# Patient Record
Sex: FEMALE | RURAL
[De-identification: names, ages, dates, MRNs, and addresses within clinical notes are randomized per-mention and may not be internally consistent; named-entity substitution may affect disease eponyms.]

---

## 2020-10-27 ENCOUNTER — HISTORICAL (OUTPATIENT)
Dept: ADMINISTRATIVE | Facility: HOSPITAL | Age: 20
End: 2020-10-27

## 2020-10-28 LAB — HCG SERPL-ACNC: NORMAL MIU/ML

## 2025-03-05 DIAGNOSIS — K21.9 GERD (GASTROESOPHAGEAL REFLUX DISEASE): Primary | ICD-10-CM

## 2025-04-23 ENCOUNTER — OFFICE VISIT (OUTPATIENT)
Dept: GASTROENTEROLOGY | Facility: CLINIC | Age: 25
End: 2025-04-23
Payer: COMMERCIAL

## 2025-04-23 VITALS
DIASTOLIC BLOOD PRESSURE: 56 MMHG | WEIGHT: 225.38 LBS | HEIGHT: 65 IN | SYSTOLIC BLOOD PRESSURE: 115 MMHG | HEART RATE: 61 BPM | BODY MASS INDEX: 37.55 KG/M2 | OXYGEN SATURATION: 100 %

## 2025-04-23 DIAGNOSIS — K21.9 GASTROESOPHAGEAL REFLUX DISEASE, UNSPECIFIED WHETHER ESOPHAGITIS PRESENT: ICD-10-CM

## 2025-04-23 DIAGNOSIS — R19.4 CHANGE IN BOWEL HABITS: ICD-10-CM

## 2025-04-23 DIAGNOSIS — R10.13 EPIGASTRIC ABDOMINAL PAIN: Primary | ICD-10-CM

## 2025-04-23 PROCEDURE — 3074F SYST BP LT 130 MM HG: CPT | Mod: CPTII,,,

## 2025-04-23 PROCEDURE — 99999 PR PBB SHADOW E&M-NEW PATIENT-LVL IV: CPT | Mod: PBBFAC,,,

## 2025-04-23 PROCEDURE — 3078F DIAST BP <80 MM HG: CPT | Mod: CPTII,,,

## 2025-04-23 PROCEDURE — 99204 OFFICE O/P NEW MOD 45 MIN: CPT | Mod: PBBFAC

## 2025-04-23 PROCEDURE — 99204 OFFICE O/P NEW MOD 45 MIN: CPT | Mod: S$PBB,,,

## 2025-04-23 PROCEDURE — 1159F MED LIST DOCD IN RCRD: CPT | Mod: CPTII,,,

## 2025-04-23 PROCEDURE — 3008F BODY MASS INDEX DOCD: CPT | Mod: CPTII,,,

## 2025-04-23 RX ORDER — OMEPRAZOLE 40 MG/1
40 CAPSULE, DELAYED RELEASE ORAL DAILY
Qty: 90 CAPSULE | Refills: 3 | Status: SHIPPED | OUTPATIENT
Start: 2025-04-23 | End: 2026-04-23

## 2025-04-23 RX ORDER — LAMOTRIGINE 100 MG/1
100 TABLET ORAL
COMMUNITY
Start: 2025-04-21

## 2025-04-23 RX ORDER — PROPRANOLOL HYDROCHLORIDE 10 MG/1
10 TABLET ORAL 2 TIMES DAILY
COMMUNITY
Start: 2025-04-17

## 2025-04-23 RX ORDER — BUSPIRONE HYDROCHLORIDE 7.5 MG/1
7.5 TABLET ORAL 2 TIMES DAILY
COMMUNITY
Start: 2025-04-17

## 2025-04-23 RX ORDER — SULFAMETHOXAZOLE AND TRIMETHOPRIM 800; 160 MG/1; MG/1
TABLET ORAL
COMMUNITY
Start: 2025-04-21

## 2025-04-23 NOTE — PROGRESS NOTES
Gastroenterology Clinic Note    Patient ID: [id number]   Referring MD: Zenobia Gonzalez MD   Chief Complaint:   Chief Complaint   Patient presents with    Gastroesophageal Reflux     UQ Abdominal Pain       History of Present Illness   Debra Butler is an 24 y.o. WF who is referred for GERD.  Patient reports issues with GERD over the past 5 years.  She has previously taken omeprazole over-the-counter daily with no relief.  She states that her symptoms have been progressively worsening.  She is experiencing epigastric abdominal pain.  Symptoms are made worse by eating but are also there constantly.  She has nausea without vomiting.  She states the pain radiates to her back.  She also reports a change in her bowel habits recently.  She states her stool is thinner and she will have issues with constipation.  She denies hematochezia or melena.  No unintentional weight loss reported.    Previous workup:  Gallbladder ultrasound    No family history of CRC for IBD reported.    Review of Systems   Constitutional:  Negative for weight loss.   Gastrointestinal:  Positive for abdominal pain, constipation, diarrhea, heartburn and nausea. Negative for blood in stool, melena and vomiting.       Past Medical History    No past medical history on file.    Past Surgical History   No past surgical history on file.    Allergies     Review of patient's allergies indicates:   Allergen Reactions    Latex Rash       Immunization History     There is no immunization history on file for this patient.    Past Family History    No family history on file.    Past Social History    Social History[1]    Current Medications     Outpatient Medications Marked as Taking for the 4/23/25 encounter (Office Visit) with Gela Palma FNP   Medication Sig Dispense Refill    busPIRone (BUSPAR) 7.5 MG tablet Take 7.5 mg by mouth 2 (two) times daily. for anxiety      lamoTRIgine (LAMICTAL) 100 MG tablet 100 mg.      propranoloL (INDERAL) 10 MG tablet Take 10  "mg by mouth 2 (two) times daily.      sulfamethoxazole-trimethoprim 800-160mg (BACTRIM DS) 800-160 mg Tab           I have reviewed the current medications, allergies, vital signs, past medical and surgical history, family medical history, and social history for this encounter and agree with all findings.    OBJECTIVE    Physical Exam    BP (!) 115/56   Pulse 61   Ht 5' 5" (1.651 m)   Wt 102.2 kg (225 lb 6.4 oz)   SpO2 100%   BMI 37.51 kg/m²   GEN: Well appearing, cooperative, NAD  NECK: Supple, no LAD  CV: Normal rate  RESP: Unlabored  ABD: ND, no guarding  EXT: No clubbing, cyanosis, or edema  SKIN: Warm and dry  NEURO: AAO x4.     LABS    CBC (with or without Differential): No results found for: "WBC", "HGB", "HCT", "MCV", "MCH", "MCHC", "RDW", "PLT", "MPV", "NEUTROPCT", "NEUTOPHILPCT", "MONOPCT", "EOSPCT", "BASOPCT", "DIFFTYPE"  BMP/CMP: No results found for: "NA", "K", "CL", "CO2", "BUN", "CREATININE", "LABCREA", "GLU", "CALCIUM", "ALB", "ALBUMIN", "PHOSPHORUS", "AST", "ALT", "ALKPHOS", "MG"     IMAGING  Gallbladder ultrasound - unremarkable    ASSESSMENT  Debra Butler is a 24 y.o. WF with no significant medical history who is referred for GERD.    1. Epigastric abdominal pain    2. Gastroesophageal reflux disease, unspecified whether esophagitis present    3. Change in bowel habits           PLAN    - schedule EGD for GERD complicated by epigastric pain; discussed procedure with patient, including risks and benefits, patient verbalized understanding  - Prilosec 40 mg daily for GERD  - start a daily fiber supplement; if no improvement in bowel habits at follow-up, consider colonoscopy   - consider HIDA scan if the above workup is unremarkable and symptoms persist    Patient Instructions   - I recommend starting a daily fiber supplement with Citrucel or Fibercon (can purchase at your local pharmacy)        No orders of the defined types were placed in this encounter.        The risks and benefits of my " recommendations, as well as other treatment options were discussed with the patient today. All questions were answered.    45 minutes of total time spent on the encounter, which includes face to face time and non-face to face time preparing to see the patient (eg, review of tests), obtaining and/or reviewing separately obtained history, documenting clinical information in the electronic or other health record, Independently interpreting results (not separately reported) and communicating results to the patient/family/caregiver, or care coordination (not separately reported).        FRANKLIN Dennis/ACNP  [facility]         [1]   Social History  Socioeconomic History    Marital status: Single